# Patient Record
Sex: MALE | Race: WHITE
[De-identification: names, ages, dates, MRNs, and addresses within clinical notes are randomized per-mention and may not be internally consistent; named-entity substitution may affect disease eponyms.]

---

## 2018-01-18 ENCOUNTER — HOSPITAL ENCOUNTER (EMERGENCY)
Dept: HOSPITAL 84 - D.ER | Age: 48
Discharge: HOME | End: 2018-01-18
Payer: SELF-PAY

## 2018-01-18 DIAGNOSIS — F17.200: ICD-10-CM

## 2018-01-18 DIAGNOSIS — I44.7: ICD-10-CM

## 2018-01-18 DIAGNOSIS — R00.2: Primary | ICD-10-CM

## 2018-01-18 LAB
ALBUMIN SERPL-MCNC: 4.2 G/DL (ref 3.4–5)
ALP SERPL-CCNC: 40 U/L (ref 46–116)
ALT SERPL-CCNC: 43 U/L (ref 10–68)
ANION GAP SERPL CALC-SCNC: 9.7 MMOL/L (ref 8–16)
BASOPHILS NFR BLD AUTO: 0.2 % (ref 0–2)
BILIRUB SERPL-MCNC: 0.6 MG/DL (ref 0.2–1.3)
BUN SERPL-MCNC: 16 MG/DL (ref 7–18)
CALCIUM SERPL-MCNC: 9 MG/DL (ref 8.5–10.1)
CHLORIDE SERPL-SCNC: 101 MMOL/L (ref 98–107)
CK SERPL-CCNC: 131 UL (ref 21–232)
CO2 SERPL-SCNC: 34.3 MMOL/L (ref 21–32)
CREAT SERPL-MCNC: 0.8 MG/DL (ref 0.6–1.3)
EOSINOPHIL NFR BLD: 2.5 % (ref 0–7)
ERYTHROCYTE [DISTWIDTH] IN BLOOD BY AUTOMATED COUNT: 12.8 % (ref 11.5–14.5)
GLOBULIN SER-MCNC: 3.2 G/L
GLUCOSE SERPL-MCNC: 95 MG/DL (ref 74–106)
HCT VFR BLD CALC: 48.3 % (ref 42–54)
HGB BLD-MCNC: 16.7 G/DL (ref 13.5–17.5)
IMM GRANULOCYTES NFR BLD: 0.3 % (ref 0–5)
LYMPHOCYTES NFR BLD AUTO: 24.1 % (ref 15–50)
MAGNESIUM SERPL-MCNC: 1.8 MG/DL (ref 1.8–2.4)
MCH RBC QN AUTO: 30.5 PG (ref 26–34)
MCHC RBC AUTO-ENTMCNC: 34.6 G/DL (ref 31–37)
MCV RBC: 88.3 FL (ref 80–100)
MONOCYTES NFR BLD: 5.1 % (ref 2–11)
NEUTROPHILS NFR BLD AUTO: 67.8 % (ref 40–80)
NT-PROBNP SERPL-MCNC: 86 PG/ML (ref 0–125)
OSMOLALITY SERPL CALC.SUM OF ELEC: 281 MOSM/KG (ref 275–300)
PLATELET # BLD: 238 10X3/UL (ref 130–400)
PMV BLD AUTO: 10.3 FL (ref 7.4–10.4)
POTASSIUM SERPL-SCNC: 4 MMOL/L (ref 3.5–5.1)
PROT SERPL-MCNC: 7.4 G/DL (ref 6.4–8.2)
RBC # BLD AUTO: 5.47 10X6/UL (ref 4.2–6.1)
SODIUM SERPL-SCNC: 141 MMOL/L (ref 136–145)
TROPONIN I SERPL-MCNC: < 0.017 NG/ML (ref 0–0.06)
WBC # BLD AUTO: 13.2 10X3/UL (ref 4.8–10.8)

## 2018-01-31 ENCOUNTER — HOSPITAL ENCOUNTER (OUTPATIENT)
Dept: HOSPITAL 84 - D.ECHO | Age: 48
Discharge: HOME | End: 2018-01-31
Attending: INTERNAL MEDICINE
Payer: SELF-PAY

## 2018-01-31 DIAGNOSIS — R07.9: ICD-10-CM

## 2018-01-31 DIAGNOSIS — R00.2: ICD-10-CM

## 2018-01-31 DIAGNOSIS — R42: Primary | ICD-10-CM

## 2018-09-25 ENCOUNTER — HOSPITAL ENCOUNTER (EMERGENCY)
Dept: HOSPITAL 84 - D.ER | Age: 48
Discharge: HOME | End: 2018-09-25
Payer: COMMERCIAL

## 2018-09-25 VITALS — WEIGHT: 155.33 LBS | BODY MASS INDEX: 23.01 KG/M2 | HEIGHT: 69 IN

## 2018-09-25 VITALS — DIASTOLIC BLOOD PRESSURE: 99 MMHG | SYSTOLIC BLOOD PRESSURE: 139 MMHG

## 2018-09-25 DIAGNOSIS — S50.02XA: Primary | ICD-10-CM

## 2018-09-25 DIAGNOSIS — S70.02XA: ICD-10-CM

## 2018-09-25 DIAGNOSIS — S40.012A: ICD-10-CM
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
## 2018-09-25 DIAGNOSIS — S60.012A: ICD-10-CM

## 2018-09-25 DIAGNOSIS — F17.200: ICD-10-CM

## 2019-09-04 ENCOUNTER — HOSPITAL ENCOUNTER (EMERGENCY)
Dept: HOSPITAL 84 - D.ER | Age: 49
Discharge: HOME | End: 2019-09-04
Payer: COMMERCIAL

## 2019-09-04 VITALS — HEIGHT: 69 IN | BODY MASS INDEX: 24.49 KG/M2 | WEIGHT: 165.35 LBS

## 2019-09-04 VITALS — DIASTOLIC BLOOD PRESSURE: 84 MMHG | SYSTOLIC BLOOD PRESSURE: 126 MMHG

## 2019-09-04 DIAGNOSIS — S90.465A: Primary | ICD-10-CM
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
## 2019-09-04 DIAGNOSIS — L03.032: ICD-10-CM

## 2019-09-04 LAB
ALBUMIN SERPL-MCNC: 4.1 G/DL (ref 3.4–5)
ALP SERPL-CCNC: 57 U/L (ref 46–116)
ALT SERPL-CCNC: 36 U/L (ref 10–68)
ANION GAP SERPL CALC-SCNC: 12 MMOL/L (ref 8–16)
BASOPHILS NFR BLD AUTO: 0.2 % (ref 0–2)
BILIRUB SERPL-MCNC: 0.77 MG/DL (ref 0.2–1.3)
BUN SERPL-MCNC: 9 MG/DL (ref 7–18)
CALCIUM SERPL-MCNC: 9.2 MG/DL (ref 8.5–10.1)
CHLORIDE SERPL-SCNC: 103 MMOL/L (ref 98–107)
CO2 SERPL-SCNC: 29.6 MMOL/L (ref 21–32)
CREAT SERPL-MCNC: 0.8 MG/DL (ref 0.6–1.3)
EOSINOPHIL NFR BLD: 2.7 % (ref 0–7)
ERYTHROCYTE [DISTWIDTH] IN BLOOD BY AUTOMATED COUNT: 13 % (ref 11.5–14.5)
GLOBULIN SER-MCNC: 3.4 G/L
GLUCOSE SERPL-MCNC: 97 MG/DL (ref 74–106)
HCT VFR BLD CALC: 47 % (ref 42–54)
HGB BLD-MCNC: 16.9 G/DL (ref 13.5–17.5)
IMM GRANULOCYTES NFR BLD: 0.2 % (ref 0–5)
LYMPHOCYTES NFR BLD AUTO: 25 % (ref 15–50)
MCH RBC QN AUTO: 31 PG (ref 26–34)
MCHC RBC AUTO-ENTMCNC: 36 G/DL (ref 31–37)
MCV RBC: 86.1 FL (ref 80–100)
MONOCYTES NFR BLD: 6.1 % (ref 2–11)
NEUTROPHILS NFR BLD AUTO: 65.8 % (ref 40–80)
OSMOLALITY SERPL CALC.SUM OF ELEC: 277 MOSM/KG (ref 275–300)
PLATELET # BLD: 265 10X3/UL (ref 130–400)
PMV BLD AUTO: 10.4 FL (ref 7.4–10.4)
POTASSIUM SERPL-SCNC: 4.6 MMOL/L (ref 3.5–5.1)
PROT SERPL-MCNC: 7.5 G/DL (ref 6.4–8.2)
RBC # BLD AUTO: 5.46 10X6/UL (ref 4.2–6.1)
SODIUM SERPL-SCNC: 140 MMOL/L (ref 136–145)
WBC # BLD AUTO: 14 10X3/UL (ref 4.8–10.8)

## 2019-09-07 ENCOUNTER — HOSPITAL ENCOUNTER (EMERGENCY)
Dept: HOSPITAL 84 - D.ER | Age: 49
Discharge: HOME | End: 2019-09-07
Payer: COMMERCIAL

## 2019-09-07 VITALS — HEIGHT: 69 IN | WEIGHT: 165.35 LBS | BODY MASS INDEX: 24.49 KG/M2

## 2019-09-07 VITALS — SYSTOLIC BLOOD PRESSURE: 121 MMHG | DIASTOLIC BLOOD PRESSURE: 76 MMHG

## 2019-09-07 DIAGNOSIS — L03.116: Primary | ICD-10-CM

## 2020-06-05 ENCOUNTER — HOSPITAL ENCOUNTER (EMERGENCY)
Dept: HOSPITAL 84 - D.ER | Age: 50
Discharge: HOME | End: 2020-06-05
Payer: COMMERCIAL

## 2020-06-05 VITALS
HEIGHT: 69 IN | WEIGHT: 160.34 LBS | BODY MASS INDEX: 23.75 KG/M2 | HEIGHT: 69 IN | WEIGHT: 160.34 LBS | BODY MASS INDEX: 23.75 KG/M2

## 2020-06-05 VITALS — DIASTOLIC BLOOD PRESSURE: 81 MMHG | SYSTOLIC BLOOD PRESSURE: 135 MMHG

## 2020-06-05 DIAGNOSIS — S05.02XA: Primary | ICD-10-CM

## 2021-05-29 ENCOUNTER — HOSPITAL ENCOUNTER (OUTPATIENT)
Dept: HOSPITAL 84 - D.ER | Age: 51
Setting detail: OBSERVATION
LOS: 1 days | Discharge: HOME | End: 2021-05-30
Attending: FAMILY MEDICINE | Admitting: FAMILY MEDICINE
Payer: COMMERCIAL

## 2021-05-29 VITALS — DIASTOLIC BLOOD PRESSURE: 80 MMHG | SYSTOLIC BLOOD PRESSURE: 129 MMHG

## 2021-05-29 VITALS
HEIGHT: 69 IN | WEIGHT: 149.11 LBS | HEIGHT: 69 IN | BODY MASS INDEX: 22.09 KG/M2 | WEIGHT: 149.11 LBS | BODY MASS INDEX: 22.09 KG/M2 | BODY MASS INDEX: 22.09 KG/M2

## 2021-05-29 VITALS — SYSTOLIC BLOOD PRESSURE: 141 MMHG | DIASTOLIC BLOOD PRESSURE: 90 MMHG

## 2021-05-29 VITALS — SYSTOLIC BLOOD PRESSURE: 144 MMHG | DIASTOLIC BLOOD PRESSURE: 79 MMHG

## 2021-05-29 VITALS — SYSTOLIC BLOOD PRESSURE: 138 MMHG | DIASTOLIC BLOOD PRESSURE: 84 MMHG

## 2021-05-29 VITALS — SYSTOLIC BLOOD PRESSURE: 127 MMHG | DIASTOLIC BLOOD PRESSURE: 85 MMHG

## 2021-05-29 VITALS — SYSTOLIC BLOOD PRESSURE: 132 MMHG | DIASTOLIC BLOOD PRESSURE: 77 MMHG

## 2021-05-29 VITALS — DIASTOLIC BLOOD PRESSURE: 96 MMHG | SYSTOLIC BLOOD PRESSURE: 130 MMHG

## 2021-05-29 VITALS — SYSTOLIC BLOOD PRESSURE: 145 MMHG | DIASTOLIC BLOOD PRESSURE: 83 MMHG

## 2021-05-29 VITALS — DIASTOLIC BLOOD PRESSURE: 85 MMHG | SYSTOLIC BLOOD PRESSURE: 137 MMHG

## 2021-05-29 VITALS — DIASTOLIC BLOOD PRESSURE: 83 MMHG | SYSTOLIC BLOOD PRESSURE: 133 MMHG

## 2021-05-29 VITALS — DIASTOLIC BLOOD PRESSURE: 82 MMHG | SYSTOLIC BLOOD PRESSURE: 132 MMHG

## 2021-05-29 VITALS — DIASTOLIC BLOOD PRESSURE: 67 MMHG | SYSTOLIC BLOOD PRESSURE: 111 MMHG

## 2021-05-29 VITALS — DIASTOLIC BLOOD PRESSURE: 95 MMHG | SYSTOLIC BLOOD PRESSURE: 137 MMHG

## 2021-05-29 DIAGNOSIS — R11.2: ICD-10-CM

## 2021-05-29 DIAGNOSIS — R19.7: ICD-10-CM

## 2021-05-29 DIAGNOSIS — R53.83: ICD-10-CM

## 2021-05-29 DIAGNOSIS — R19.5: ICD-10-CM

## 2021-05-29 DIAGNOSIS — K92.2: Primary | ICD-10-CM

## 2021-05-29 DIAGNOSIS — R31.9: ICD-10-CM

## 2021-05-29 DIAGNOSIS — E86.0: ICD-10-CM

## 2021-05-29 DIAGNOSIS — R00.0: ICD-10-CM

## 2021-05-29 DIAGNOSIS — R10.9: ICD-10-CM

## 2021-05-29 LAB
ALBUMIN SERPL-MCNC: 3.7 G/DL (ref 3.4–5)
ALP SERPL-CCNC: 31 U/L (ref 30–120)
ALT SERPL-CCNC: 38 U/L (ref 10–68)
AMYLASE SERPL-CCNC: 60 U/L (ref 25–115)
ANION GAP SERPL CALC-SCNC: 14.7 MMOL/L (ref 8–16)
APTT BLD: 32.4 SECONDS (ref 22.8–39.4)
BACTERIA #/AREA URNS HPF: (no result) HPF
BASOPHILS NFR BLD AUTO: 0.4 % (ref 0–2)
BILIRUB SERPL-MCNC: 0.97 MG/DL (ref 0.2–1.3)
BILIRUB SERPL-MCNC: NEGATIVE MG/DL
BUN SERPL-MCNC: 34 MG/DL (ref 7–18)
CALCIUM SERPL-MCNC: 8.6 MG/DL (ref 8.5–10.1)
CHLORIDE SERPL-SCNC: 103 MMOL/L (ref 98–107)
CK MB SERPL-MCNC: 0.9 U/L (ref 0–3.6)
CK SERPL-CCNC: 39 UL (ref 21–232)
CO2 SERPL-SCNC: 26.3 MMOL/L (ref 21–32)
CREAT SERPL-MCNC: 0.8 MG/DL (ref 0.6–1.3)
EOSINOPHIL NFR BLD: 0.1 % (ref 0–7)
ERYTHROCYTE [DISTWIDTH] IN BLOOD BY AUTOMATED COUNT: 13.5 % (ref 11.5–14.5)
GLOBULIN SER-MCNC: 3.2 G/L
GLUCOSE SERPL-MCNC: 201 MG/DL (ref 74–106)
HCT VFR BLD CALC: 32.1 % (ref 42–54)
HCT VFR BLD CALC: 32.3 % (ref 42–54)
HCT VFR BLD CALC: 42.1 % (ref 42–54)
HGB BLD-MCNC: 10.9 G/DL (ref 13.5–17.5)
HGB BLD-MCNC: 11 G/DL (ref 13.5–17.5)
HGB BLD-MCNC: 14 G/DL (ref 13.5–17.5)
INR PPP: 1.21 (ref 0.85–1.17)
KETONES UR STRIP-MCNC: NEGATIVE MG/DL
LIPASE SERPL-CCNC: 25 U/L (ref 73–393)
LYMPHOCYTES # BLD: 2.6 10X3/UL (ref 1.32–3.57)
LYMPHOCYTES NFR BLD AUTO: 14.6 % (ref 15–50)
MCH RBC QN AUTO: 29.4 PG (ref 26–34)
MCHC RBC AUTO-ENTMCNC: 33.2 G/DL (ref 31–37)
MCV RBC: 88.5 FL (ref 80–100)
MONOCYTES NFR BLD: 4.8 % (ref 2–11)
NEUTROPHILS # BLD: 14.3 10X3/UL (ref 1.78–5.38)
NEUTROPHILS NFR BLD AUTO: 80.1 % (ref 40–80)
NITRITE UR-MCNC: NEGATIVE MG/ML
OSMOLALITY SERPL CALC.SUM OF ELEC: 292 MOSM/KG (ref 275–300)
PH UR STRIP: 5 [PH] (ref 5–8)
PLATELET # BLD: 319 10X3/UL (ref 130–400)
PMV BLD AUTO: 8.5 FL (ref 7.4–10.4)
POTASSIUM SERPL-SCNC: 4 MMOL/L (ref 3.5–5.1)
PROT SERPL-MCNC: 6.9 G/DL (ref 6.4–8.2)
PROTHROMBIN TIME: 14.1 SECONDS (ref 11.6–15)
RBC # BLD AUTO: 4.76 10X6/UL (ref 4.2–6.1)
SODIUM SERPL-SCNC: 140 MMOL/L (ref 136–145)
SQUAMOUS #/AREA URNS HPF: (no result) HPF (ref 0–4)
TROPONIN I SERPL-MCNC: < 0.017 NG/ML (ref 0–0.06)
UROBILINOGEN UR-MCNC: NORMAL MG/DL (ref ?–2)
WBC # BLD AUTO: 17.8 10X3/UL (ref 4.8–10.8)
WBC #/AREA URNS HPF: (no result) HPF (ref 0–1)

## 2021-05-29 NOTE — NUR
DR. DOMINGUEZ PAGED REGARDING DIET, PT OKAY FOR CLEAR LIQUIDS UNTIL MIDNIGHT.
CHICKEN AND BEEF BROTH SERVED, 650MG TYLENOL GIVEN PO FOR HEADACHE, PT DENIES
FURTHER NEEDS.

## 2021-05-29 NOTE — NUR
PATIENT  TO FLOOR. THREW UP ALMOST A HALF OF A BLUE BAG OF MAURO BLOOD AND
BLOOD CLOTS. CALLED [facility] APN. NEW ORDERS RECIEVED AT THIS TIME. CALL LIGHT
WITHIN REACH.

## 2021-05-29 NOTE — NUR
sTOOL FOR CULTURE SENT TO LAB. URINE FOR URINALYSIS REQUESTED FROM PATIENT.
STATES "I CAN'T GO RIGHT NOW".

## 2021-05-29 NOTE — NUR
REPORT REC'D AND CARE ASSUMED, REC'D PT SITTING UP IN BED WATCHING TV, PT
COMPLAINS OF HEADACHE AND WANTING TO SOMETHING TO EAT, RIGHT FOREARM PIV
PATENT WITHOUT REDNESS OR EDEMA, LEFT A/C PIV WITHOUT REDNESS OR EDEMA, MAEE,
SR UP X 2, BED IN LOW POSITION, CALL LIGHT IN REACH.

## 2021-05-29 NOTE — NUR
TRIED TO CALL PATIENTS FATHER TO LET HIM KNOW PATIENT MOVED TO ICU. NO ANSWER
AT THIS TIME. VOICE BOX IS FULL. UNABLE TO LEAVE MESSAGE. NOTIFIED PATIENT AND
ICU NURSE.

## 2021-05-29 NOTE — NUR
RECIEVED PT FROM FLOOR AT THIS TIME VIA BED ACCOMPANIED BY HOSPITAL STAFF WITH
PERSONAL ITEMS (CELL PHONE, WALLET, GLASSES, CLOTHES, SANDALS. HE CURRENTLY
DENIES ANY PAIN OR DISCOMFORTS. HE DENIES NAUSEA OR ABDOMINAL PAIN. HE IS
ALERT AND ORIENTED. WILL CONTINUE PLAN OF CARE.

## 2021-05-30 VITALS — SYSTOLIC BLOOD PRESSURE: 123 MMHG | DIASTOLIC BLOOD PRESSURE: 71 MMHG

## 2021-05-30 VITALS — SYSTOLIC BLOOD PRESSURE: 134 MMHG | DIASTOLIC BLOOD PRESSURE: 79 MMHG

## 2021-05-30 VITALS — DIASTOLIC BLOOD PRESSURE: 78 MMHG | SYSTOLIC BLOOD PRESSURE: 122 MMHG

## 2021-05-30 VITALS — DIASTOLIC BLOOD PRESSURE: 71 MMHG | SYSTOLIC BLOOD PRESSURE: 128 MMHG

## 2021-05-30 VITALS — SYSTOLIC BLOOD PRESSURE: 129 MMHG | DIASTOLIC BLOOD PRESSURE: 67 MMHG

## 2021-05-30 VITALS — SYSTOLIC BLOOD PRESSURE: 126 MMHG | DIASTOLIC BLOOD PRESSURE: 80 MMHG

## 2021-05-30 VITALS — DIASTOLIC BLOOD PRESSURE: 79 MMHG | SYSTOLIC BLOOD PRESSURE: 133 MMHG

## 2021-05-30 VITALS — SYSTOLIC BLOOD PRESSURE: 137 MMHG | DIASTOLIC BLOOD PRESSURE: 74 MMHG

## 2021-05-30 VITALS — DIASTOLIC BLOOD PRESSURE: 73 MMHG | SYSTOLIC BLOOD PRESSURE: 125 MMHG

## 2021-05-30 VITALS — DIASTOLIC BLOOD PRESSURE: 68 MMHG | SYSTOLIC BLOOD PRESSURE: 108 MMHG

## 2021-05-30 VITALS — DIASTOLIC BLOOD PRESSURE: 76 MMHG | SYSTOLIC BLOOD PRESSURE: 119 MMHG

## 2021-05-30 VITALS — SYSTOLIC BLOOD PRESSURE: 117 MMHG | DIASTOLIC BLOOD PRESSURE: 56 MMHG

## 2021-05-30 VITALS — DIASTOLIC BLOOD PRESSURE: 69 MMHG | SYSTOLIC BLOOD PRESSURE: 124 MMHG

## 2021-05-30 VITALS — DIASTOLIC BLOOD PRESSURE: 82 MMHG | SYSTOLIC BLOOD PRESSURE: 115 MMHG

## 2021-05-30 VITALS — SYSTOLIC BLOOD PRESSURE: 126 MMHG | DIASTOLIC BLOOD PRESSURE: 70 MMHG

## 2021-05-30 VITALS — SYSTOLIC BLOOD PRESSURE: 139 MMHG | DIASTOLIC BLOOD PRESSURE: 73 MMHG

## 2021-05-30 VITALS — DIASTOLIC BLOOD PRESSURE: 63 MMHG | SYSTOLIC BLOOD PRESSURE: 122 MMHG

## 2021-05-30 VITALS — DIASTOLIC BLOOD PRESSURE: 66 MMHG | SYSTOLIC BLOOD PRESSURE: 111 MMHG

## 2021-05-30 LAB
ALBUMIN SERPL-MCNC: 3 G/DL (ref 3.4–5)
ALP SERPL-CCNC: 21 U/L (ref 30–120)
ALT SERPL-CCNC: 35 U/L (ref 10–68)
ANION GAP SERPL CALC-SCNC: 9.6 MMOL/L (ref 8–16)
BASOPHILS NFR BLD AUTO: 0.7 % (ref 0–2)
BILIRUB SERPL-MCNC: 0.75 MG/DL (ref 0.2–1.3)
BUN SERPL-MCNC: 20 MG/DL (ref 7–18)
CALCIUM SERPL-MCNC: 7.7 MG/DL (ref 8.5–10.1)
CHLORIDE SERPL-SCNC: 108 MMOL/L (ref 98–107)
CO2 SERPL-SCNC: 29.2 MMOL/L (ref 21–32)
CREAT SERPL-MCNC: 0.8 MG/DL (ref 0.6–1.3)
EOSINOPHIL NFR BLD: 1.7 % (ref 0–7)
ERYTHROCYTE [DISTWIDTH] IN BLOOD BY AUTOMATED COUNT: 13.4 % (ref 11.5–14.5)
GLOBULIN SER-MCNC: 2.5 G/L
GLUCOSE SERPL-MCNC: 102 MG/DL (ref 74–106)
HCT VFR BLD CALC: 29.5 % (ref 42–54)
HCT VFR BLD CALC: 31.8 % (ref 42–54)
HCT VFR BLD CALC: 32.6 % (ref 42–54)
HGB BLD-MCNC: 10 G/DL (ref 13.5–17.5)
HGB BLD-MCNC: 11.1 G/DL (ref 13.5–17.5)
HGB BLD-MCNC: 11.1 G/DL (ref 13.5–17.5)
LYMPHOCYTES NFR BLD AUTO: 35.3 % (ref 15–50)
MCH RBC QN AUTO: 30.2 PG (ref 26–34)
MCHC RBC AUTO-ENTMCNC: 34 G/DL (ref 31–37)
MCV RBC: 88.8 FL (ref 80–100)
MONOCYTES NFR BLD: 5.6 % (ref 2–11)
NEUTROPHILS # BLD: 7.1 10X3/UL (ref 1.78–5.38)
NEUTROPHILS NFR BLD AUTO: 56.7 % (ref 40–80)
OSMOLALITY SERPL CALC.SUM OF ELEC: 287 MOSM/KG (ref 275–300)
PLATELET # BLD: 196 10X3/UL (ref 130–400)
PMV BLD AUTO: 9.6 FL (ref 7.4–10.4)
POTASSIUM SERPL-SCNC: 3.8 MMOL/L (ref 3.5–5.1)
PROT SERPL-MCNC: 5.5 G/DL (ref 6.4–8.2)
RBC # BLD AUTO: 3.67 10X6/UL (ref 4.2–6.1)
SODIUM SERPL-SCNC: 143 MMOL/L (ref 136–145)
WBC # BLD AUTO: 12.6 10X3/UL (ref 4.8–10.8)

## 2021-05-30 NOTE — NUR
DISCHARGE PAPERWORK SIGNED BY PT. HE HAS RECIEVED HIS DISCHARGE TEACHINGS.
FATHER AT BEDSIDE. PT STATES HE WILL BE ABLE TO FOLLOWUP WITH PCP IN 1 WEEK
AND WILL BE ABLE TO  HIS NEW PERSCRIPTION. DENEIS ANY QUESTIONS OR
CONCERNS. PER DR DOMINGUEZ PT NEEDS TO EAT A SOFT DIET FOR 2 DAYS THEN ADVANCE
DIET AS TOLERATED. PT STATES HE UNDERSTANDS TEACHINGS. LT AC AND RT FOREARM
IVS DCD, CATHETER TIP INTACT. NO ACUTE DISTRESS NOTED. DISCHARGED VIA PERSONAL
VEHICLE WITH FATHER TRANSPORTED VIA WHEELCHAIR WITH STAFF AND FATHER. HE
TRANSFERRED SELF INTO VEHICLE WITHOUT DIFFICULTY. NO FURTHER ACTIONS.

## 2021-05-30 NOTE — NUR
PER DR ALBERTO HAGER FOR CLEAR LIQUID DIET AND PT CAN GO TO THE FLOOR. PT LYING
IN BED AWAKE, DENIES ANY CURRENT PAINS OR DISCOMFORTS. VSS. WILL CONTINUE PLAN
OF CARE.

## 2021-05-30 NOTE — NUR
PT REQUESTING A FRESH SODA TO DRINK, REMINDED PT OF NPO STATUS AT MIDNIGHT,
LEMON GLYCERIN SWABS PROVIDED FOR COMFORT, PT DENIES OTHER NEEDS.

## 2021-05-30 NOTE — NUR
PT REQUESTS TO GO HOME INSTEAD OF GOING TO THE FLOOR TODAY. SPOKE WITH DR DOMINGUEZ, HE STATED HE IS OKAY TO GO TO THE FLOOR AS LONG AS DISCHARGED ON A
PPI MEDICATION. CALLED DR AYOUB AND NOTIFIED HER, SHE STATED IT WILL BE OKAY
FOR PT TO GO HOME TODAY AND SHE WILL BE BY IN A LITTLE WHILE TO PLACE THE
ORDERS.